# Patient Record
Sex: FEMALE | Race: WHITE | NOT HISPANIC OR LATINO
[De-identification: names, ages, dates, MRNs, and addresses within clinical notes are randomized per-mention and may not be internally consistent; named-entity substitution may affect disease eponyms.]

---
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
## 2025-03-18 ENCOUNTER — OFFICE VISIT (OUTPATIENT)
Dept: URBAN - METROPOLITAN AREA CLINIC 54 | Facility: CLINIC | Age: 20
End: 2025-03-18

## 2025-03-21 ENCOUNTER — DASHBOARD ENCOUNTERS (OUTPATIENT)
Age: 20
End: 2025-03-21

## 2025-03-21 ENCOUNTER — OFFICE VISIT (OUTPATIENT)
Dept: URBAN - METROPOLITAN AREA CLINIC 54 | Facility: CLINIC | Age: 20
End: 2025-03-21
Payer: COMMERCIAL

## 2025-03-21 DIAGNOSIS — R10.30 LOWER ABDOMINAL PAIN: ICD-10-CM

## 2025-03-21 DIAGNOSIS — R10.13 EPIGASTRIC PAIN: ICD-10-CM

## 2025-03-21 DIAGNOSIS — K59.00 CONSTIPATION, UNSPECIFIED CONSTIPATION TYPE: ICD-10-CM

## 2025-03-21 PROBLEM — 14760008: Status: ACTIVE | Noted: 2025-03-21

## 2025-03-21 PROCEDURE — 99204 OFFICE O/P NEW MOD 45 MIN: CPT | Performed by: PHYSICIAN ASSISTANT

## 2025-03-21 RX ORDER — OMEPRAZOLE 40 MG/1
1 CAPSULE 1/2 TO 1 HOUR BEFORE MORNING MEAL CAPSULE, DELAYED RELEASE ORAL ONCE A DAY
Qty: 90 | Refills: 0 | OUTPATIENT
Start: 2025-03-21

## 2025-03-21 NOTE — HPI-TODAY'S VISIT:
Zoe Boothe is a 20-year-old female patient without significant PMH who was self-referred to clinic for abdominal pain. Patient reports lower abdominal sharp, burning pain that began since Aug 2024 after starting college. Pain is worse after eating. Patient has made dietary changes w/o improvement. Bowels previously were regular, now with BMs twice weekly. Mild generalized bloating. No regular bowel regimen. Some associated abdominal pain with BMs. Patient reports occasional epigastric pain in the morning that self resolves. No NV. No previous EGD or colonoscopy.

## 2025-03-25 LAB — H PYLORI BREATH TEST: NOT DETECTED

## 2025-05-23 ENCOUNTER — OFFICE VISIT (OUTPATIENT)
Dept: URBAN - METROPOLITAN AREA CLINIC 54 | Facility: CLINIC | Age: 20
End: 2025-05-23

## 2025-05-23 RX ORDER — OMEPRAZOLE 40 MG/1
1 CAPSULE 1/2 TO 1 HOUR BEFORE MORNING MEAL CAPSULE, DELAYED RELEASE ORAL ONCE A DAY
Qty: 90 | Refills: 0 | OUTPATIENT
Start: 2025-05-23

## 2025-05-23 RX ORDER — LUBIPROSTONE 24 UG/1
1 CAPSULE WITH FOOD AND WATER CAPSULE, GELATIN COATED ORAL TWICE A DAY
Qty: 60 | Refills: 1 | OUTPATIENT
Start: 2025-05-23 | End: 2025-07-22

## 2025-05-23 RX ORDER — OMEPRAZOLE 40 MG/1
1 CAPSULE 1/2 TO 1 HOUR BEFORE MORNING MEAL CAPSULE, DELAYED RELEASE ORAL ONCE A DAY
Qty: 90 | Refills: 0 | Status: ACTIVE | COMMUNITY
Start: 2025-03-21

## 2025-05-23 NOTE — HPI-TODAY'S VISIT:
Zoe Boothe is a 20-year-old female patient without significant PMH who was self-referred to clinic for abdominal pain. Patient reports lower abdominal sharp, burning pain that began since Aug 2024 after starting college. Pain is worse after eating. Patient has made dietary changes w/o improvement. Bowels previously were regular, now with BMs twice weekly. Mild generalized bloating. No regular bowel regimen. Some associated abdominal pain with BMs. Patient reports occasional epigastric pain in the morning that self resolves. No NV. No previous EGD or colonoscopy.  5/23/25: Patient presents for routine follow up for epigastric pain, generalized bloating, and constipation. H pylori was negative. On omeprazole 40 mg QD with improvement in epigastric pain. Seemed worse during peroids of stress.

## 2025-06-18 ENCOUNTER — TELEPHONE ENCOUNTER (OUTPATIENT)
Dept: URBAN - METROPOLITAN AREA CLINIC 54 | Facility: CLINIC | Age: 20
End: 2025-06-18

## 2025-06-18 RX ORDER — PANTOPRAZOLE SODIUM 40 MG/1
1 TABLET TABLET, DELAYED RELEASE ORAL ONCE A DAY
Qty: 30 | Refills: 3 | OUTPATIENT
Start: 2025-06-18

## 2025-07-07 ENCOUNTER — OFFICE VISIT (OUTPATIENT)
Dept: URBAN - METROPOLITAN AREA TELEHEALTH 2 | Facility: TELEHEALTH | Age: 20
End: 2025-07-07

## 2025-07-21 ENCOUNTER — OFFICE VISIT (OUTPATIENT)
Dept: URBAN - METROPOLITAN AREA TELEHEALTH 2 | Facility: TELEHEALTH | Age: 20
End: 2025-07-21
Payer: COMMERCIAL

## 2025-07-21 ENCOUNTER — TELEPHONE ENCOUNTER (OUTPATIENT)
Dept: URBAN - METROPOLITAN AREA CLINIC 54 | Facility: CLINIC | Age: 20
End: 2025-07-21

## 2025-07-21 DIAGNOSIS — R10.30 LOWER ABDOMINAL PAIN: ICD-10-CM

## 2025-07-21 DIAGNOSIS — R10.13 EPIGASTRIC PAIN: ICD-10-CM

## 2025-07-21 DIAGNOSIS — K59.00 CONSTIPATION, UNSPECIFIED CONSTIPATION TYPE: ICD-10-CM

## 2025-07-21 PROCEDURE — 99442 PHONE E/M BY PHYS 11-20 MIN: CPT | Performed by: PHYSICIAN ASSISTANT

## 2025-07-21 RX ORDER — PANTOPRAZOLE SODIUM 40 MG/1
1 TABLET TABLET, DELAYED RELEASE ORAL ONCE A DAY
Qty: 30 | Refills: 3 | Status: ON HOLD | COMMUNITY
Start: 2025-06-18

## 2025-07-21 RX ORDER — LUBIPROSTONE 24 UG/1
1 CAPSULE WITH FOOD AND WATER CAPSULE, GELATIN COATED ORAL TWICE A DAY
Qty: 60 | Refills: 1 | Status: ON HOLD | COMMUNITY
Start: 2025-05-23 | End: 2025-07-22

## 2025-07-21 RX ORDER — OMEPRAZOLE 40 MG/1
1 CAPSULE 1/2 TO 1 HOUR BEFORE MORNING MEAL CAPSULE, DELAYED RELEASE ORAL ONCE A DAY
Qty: 90 | Refills: 0 | Status: ON HOLD | COMMUNITY
Start: 2025-05-23

## 2025-07-21 NOTE — HPI-TODAY'S VISIT:
Zoe Boothe is a 20-year-old female patient without significant PMH who was self-referred to clinic for abdominal pain. Patient reports lower abdominal sharp, burning pain that began since Aug 2024 after starting college. Pain is worse after eating. Patient has made dietary changes w/o improvement. Bowels previously were regular, now with BMs twice weekly. Mild generalized bloating. No regular bowel regimen. Some associated abdominal pain with BMs. Patient reports occasional epigastric pain in the morning that self resolves. No NV. No previous EGD or colonoscopy.  5/23/25: Patient presents for routine follow up for epigastric pain, generalized bloating, and constipation. H pylori was negative. On omeprazole 40 mg QD with improvement in epigastric pain. Pain seemed worse during peroids of stress while at college and she is recently home for the summer with improvement in discomfort. Still with constipation and bloating despite regular MiraLAX. Bloating is sometimes worse after eating.  7/21/25: Patient presents via telephone visit. Patient had recurrence of epigastric pain approx one month ago as she was weaning off of omeprazole 40 mg. However, since discontinuing the medication, patient has no epigastric pain. BM every 2-3 days off MiraLAX. No on rx medication. At home still for the summer.

## 2025-08-05 ENCOUNTER — OFFICE VISIT (OUTPATIENT)
Dept: URBAN - METROPOLITAN AREA SURGERY CENTER 14 | Facility: SURGERY CENTER | Age: 20
End: 2025-08-05